# Patient Record
Sex: MALE | Race: BLACK OR AFRICAN AMERICAN
[De-identification: names, ages, dates, MRNs, and addresses within clinical notes are randomized per-mention and may not be internally consistent; named-entity substitution may affect disease eponyms.]

---

## 2019-02-25 ENCOUNTER — HOSPITAL ENCOUNTER (EMERGENCY)
Dept: HOSPITAL 72 - EMR | Age: 54
Discharge: HOME | End: 2019-02-25
Payer: COMMERCIAL

## 2019-02-25 VITALS — DIASTOLIC BLOOD PRESSURE: 69 MMHG | SYSTOLIC BLOOD PRESSURE: 115 MMHG

## 2019-02-25 VITALS — SYSTOLIC BLOOD PRESSURE: 114 MMHG | DIASTOLIC BLOOD PRESSURE: 75 MMHG

## 2019-02-25 VITALS — BODY MASS INDEX: 25.03 KG/M2 | WEIGHT: 195 LBS | HEIGHT: 74 IN

## 2019-02-25 VITALS — SYSTOLIC BLOOD PRESSURE: 113 MMHG | DIASTOLIC BLOOD PRESSURE: 60 MMHG

## 2019-02-25 DIAGNOSIS — K52.9: Primary | ICD-10-CM

## 2019-02-25 LAB
ADD MANUAL DIFF: YES
ALBUMIN SERPL-MCNC: 4.2 G/DL (ref 3.4–5)
ALBUMIN/GLOB SERPL: 1 {RATIO} (ref 1–2.7)
ALP SERPL-CCNC: 79 U/L (ref 46–116)
ALT SERPL-CCNC: 41 U/L (ref 12–78)
ANION GAP SERPL CALC-SCNC: 11 MMOL/L (ref 5–15)
APPEARANCE UR: CLEAR
APTT PPP: YELLOW S
AST SERPL-CCNC: 22 U/L (ref 15–37)
BILIRUB SERPL-MCNC: 1 MG/DL (ref 0.2–1)
BUN SERPL-MCNC: 14 MG/DL (ref 7–18)
CALCIUM SERPL-MCNC: 10.4 MG/DL (ref 8.5–10.1)
CHLORIDE SERPL-SCNC: 104 MMOL/L (ref 98–107)
CO2 SERPL-SCNC: 26 MMOL/L (ref 21–32)
CREAT SERPL-MCNC: 1.4 MG/DL (ref 0.55–1.3)
ERYTHROCYTE [DISTWIDTH] IN BLOOD BY AUTOMATED COUNT: 11.2 % (ref 11.6–14.8)
GLOBULIN SER-MCNC: 4.2 G/DL
GLUCOSE UR STRIP-MCNC: NEGATIVE MG/DL
HCT VFR BLD CALC: 50.1 % (ref 42–52)
HGB BLD-MCNC: 16.9 G/DL (ref 14.2–18)
KETONES UR QL STRIP: (no result)
LEUKOCYTE ESTERASE UR QL STRIP: (no result)
MCV RBC AUTO: 91 FL (ref 80–99)
NITRITE UR QL STRIP: NEGATIVE
PH UR STRIP: 7 [PH] (ref 4.5–8)
PLATELET # BLD: 153 K/UL (ref 150–450)
POTASSIUM SERPL-SCNC: 4.6 MMOL/L (ref 3.5–5.1)
PROT UR QL STRIP: (no result)
RBC # BLD AUTO: 5.49 M/UL (ref 4.7–6.1)
SODIUM SERPL-SCNC: 141 MMOL/L (ref 136–145)
SP GR UR STRIP: 1 (ref 1–1.03)
UROBILINOGEN UR-MCNC: NORMAL MG/DL (ref 0–1)
WBC # BLD AUTO: 8.2 K/UL (ref 4.8–10.8)

## 2019-02-25 PROCEDURE — 83690 ASSAY OF LIPASE: CPT

## 2019-02-25 PROCEDURE — 74177 CT ABD & PELVIS W/CONTRAST: CPT

## 2019-02-25 PROCEDURE — 96374 THER/PROPH/DIAG INJ IV PUSH: CPT

## 2019-02-25 PROCEDURE — 36415 COLL VENOUS BLD VENIPUNCTURE: CPT

## 2019-02-25 PROCEDURE — 80053 COMPREHEN METABOLIC PANEL: CPT

## 2019-02-25 PROCEDURE — 96375 TX/PRO/DX INJ NEW DRUG ADDON: CPT

## 2019-02-25 PROCEDURE — 85007 BL SMEAR W/DIFF WBC COUNT: CPT

## 2019-02-25 PROCEDURE — 85025 COMPLETE CBC W/AUTO DIFF WBC: CPT

## 2019-02-25 PROCEDURE — 96361 HYDRATE IV INFUSION ADD-ON: CPT

## 2019-02-25 PROCEDURE — 99284 EMERGENCY DEPT VISIT MOD MDM: CPT

## 2019-02-25 PROCEDURE — 81003 URINALYSIS AUTO W/O SCOPE: CPT

## 2019-02-25 NOTE — EMERGENCY ROOM REPORT
History of Present Illness


General


Chief Complaint:  Abdominal Pain


Source:  Patient





Present Illness


HPI


This is a pleasant 54-year-old male who complains of lower abdominal pain that 

started this morning.  A dull pain that is constant.  There is no exacerbating 

or relieving factor.  Associated with vomiting, subjective fever, diarrhea.  He 

has not taken any medication for this.


Allergies:  


Coded Allergies:  


     No Known Allergies (Unverified , 2/25/19)





Patient History


Past Medical History:  none


Past Surgical History:  none


Pertinent Family History:  none





Nursing Documentation-[facility]


Past Medical History:  No Stated History





Review of Systems


All Other Systems:  negative except mentioned in HPI





Physical Exam





Vital Signs








  Date Time  Temp Pulse Resp B/P (MAP) Pulse Ox O2 Delivery O2 Flow Rate FiO2


 


2/25/19 16:05 99.0 101 16 114/75 96   








General Appearance:  well appearing, no apparent distress


Head:  normocephalic, atraumatic


ENT:  hearing grossly normal, normal voice


Neck:  full range of motion, supple


Respiratory:  no respiratory distress, speaking full sentences


Gastrointestinal:  soft, no peritonitis, non-distended, no guarding, no hernia, 

no pulsatile mass, tenderness - RLQ


Musculoskeletal:  normal inspection


Neurologic:  alert, normal gait


Psychiatric:  mood/affect normal


Skin:  no rash





Medical Decision Making


Diagnostic Impression:  


 Primary Impression:  


 Gastroenteritis


ER Course


Patient presents complexes or risk.  As particularly very concerned about acute 

appendicitis, testicular torsion, diverticulitis, incarcerated hernia, small 

bowel obstruction.  The patient has had multiple bedside evaluations.  Also 

considered urolithiasis and pyelonephritis.  Blood work was reviewed.  The 

patient was given IV fluids for volume resuscitation.  Patient was also given 

IV morphine for pain.  I reexamined the patient and his pain is completely 

resolved.  He is able to tolerate a by mouth challenge.  Is afebrile.  CT was 

reviewed.  At this time I feel the patient may be discharged home with very 

close follow-up with his primary care physician as an outpatient we will start 

the patient on symptomatically treatment.





Laboratory Tests








Test


  2/25/19


16:45


 


White Blood Count


  8.2 K/UL


(4.8-10.8)


 


Red Blood Count


  5.49 M/UL


(4.70-6.10)


 


Hemoglobin


  16.9 G/DL


(14.2-18.0)


 


Hematocrit


  50.1 %


(42.0-52.0)


 


Mean Corpuscular Volume 91 FL (80-99)  


 


Mean Corpuscular Hemoglobin


  30.8 PG


(27.0-31.0)


 


Mean Corpuscular Hemoglobin


Concent 33.8 G/DL


(32.0-36.0)


 


Red Cell Distribution Width


  11.2 %


(11.6-14.8)  L


 


Platelet Count


  153 K/UL


(150-450)


 


Mean Platelet Volume


  7.0 FL


(6.5-10.1)


 


Neutrophils (%) (Auto)


  % (45.0-75.0)


 


 


Lymphocytes (%) (Auto)


  % (20.0-45.0)


 


 


Monocytes (%) (Auto)  % (1.0-10.0)  


 


Eosinophils (%) (Auto)  % (0.0-3.0)  


 


Basophils (%) (Auto)  % (0.0-2.0)  


 


Differential Total Cells


Counted 100  


 


 


Neutrophils % (Manual) 68 % (45-75)  


 


Lymphocytes % (Manual) 11 % (20-45)  L


 


Monocytes % (Manual) 9 % (1-10)  


 


Eosinophils % (Manual) 0 % (0-3)  


 


Basophils % (Manual) 0 % (0-2)  


 


Band Neutrophils 12 % (0-8)  H


 


Platelet Estimate Adequate  


 


Platelet Morphology Normal  


 


Red Blood Cell Morphology Normal  


 


Urine Color Yellow  


 


Urine Appearance Clear  


 


Urine pH 7 (4.5-8.0)  


 


Urine Specific Gravity


  1.005


(1.005-1.035)


 


Urine Protein


  2+ (NEGATIVE)


H


 


Urine Glucose (UA)


  Negative


(NEGATIVE)


 


Urine Ketones


  1+ (NEGATIVE)


H


 


Urine Blood


  1+ (NEGATIVE)


H


 


Urine Nitrite


  Negative


(NEGATIVE)


 


Urine Bilirubin


  Negative


(NEGATIVE)


 


Urine Urobilinogen


  Normal MG/DL


(0.0-1.0)


 


Urine Leukocyte Esterase


  1+ (NEGATIVE)


H


 


Urine RBC


  2-4 /HPF (0 -


0)  H


 


Urine WBC


  2-4 /HPF (0 -


0)


 


Urine Squamous Epithelial


Cells None /LPF


(NONE/OCC)


 


Urine Amorphous Sediment


  Few /LPF


(NONE)  H


 


Urine Bacteria


  Few /HPF


(NONE)


 


Sodium Level


  141 MMOL/L


(136-145)


 


Potassium Level


  4.6 MMOL/L


(3.5-5.1)


 


Chloride Level


  104 MMOL/L


()


 


Carbon Dioxide Level


  26 MMOL/L


(21-32)


 


Anion Gap


  11 mmol/L


(5-15)


 


Blood Urea Nitrogen


  14 mg/dL


(7-18)


 


Creatinine


  1.4 MG/DL


(0.55-1.30)  H


 


Estimate Glomerular


Filtration Rate > 60 mL/min


(>60)


 


Glucose Level


  111 MG/DL


()  H


 


Calcium Level


  10.4 MG/DL


(8.5-10.1)  H


 


Total Bilirubin


  1.0 MG/DL


(0.2-1.0)


 


Aspartate Amino Transferase


(AST) 22 U/L (15-37)


 


 


Alanine Aminotransferase (ALT)


  41 U/L (12-78)


 


 


Alkaline Phosphatase


  79 U/L


()


 


Total Protein


  8.4 G/DL


(6.4-8.2)  H


 


Albumin


  4.2 G/DL


(3.4-5.0)


 


Globulin 4.2 g/dL  


 


Albumin/Globulin Ratio 1.0 (1.0-2.7)  


 


Lipase


  157 U/L


()











Last Vital Signs








  Date Time  Temp Pulse Resp B/P (MAP) Pulse Ox O2 Delivery O2 Flow Rate FiO2


 


2/25/19 16:27  101 16     


 


2/25/19 16:27 99.0   114/75 96   








Disposition:  HOME, SELF-CARE


Condition:  Stable


Scripts


Loperamide Hcl (IMODIUM A-D) 1 Mg/7.5 Ml Liquid


1 MG PO EVERY 3 HOURS PRN for Diarrhea for 4 Days, #10 ML


   Prov: NEO PEREZ         2/25/19 


Ondansetron (Zofran) 4 Mg Tablet


4 MG ORAL Q6H PRN for Nausea & Vomiting, #10 TAB


   Prov: NEO PEREZ         2/25/19 


Hydrocodone/Acetaminophen 7.5-325* (HYDROCODON-ACETAMINOPH 7.5-325*) 1 Each 

Tablet


1 TAB ORAL Q6H PRN for For Pain, #12 TAB 0 Refills


   Prov: NEO PEREZ         2/25/19


Patient Instructions:  Abdominal Pain, Adult











NEO PEREZ Feb 25, 2019 16:46

## 2019-02-25 NOTE — NUR
ED Nurse Note:

Patient biba from home c/o of nausea and vomiting, patient presents with an 
inguinal hernia, states that he has spoken to his doctor and states that he did 
get diagnosed with it, patient was planning to meet with his doctor but states 
that since he had an episode of extreme toumach pain today he called 911. 
patient is alert and oriented x4, ambulatory with a steady gait, VSS

## 2019-02-26 NOTE — DIAGNOSTIC IMAGING REPORT
Clinical Indication: Right lower quadrant pain

 

Technique:   No oral contrast utilized, per emergency room physician request  IV

administration nonionic contrast. Venous phase spiral acquisition obtained through

the abdomen and pelvis. Multiplanar reconstructions were generated. Total dose length

product 646.29 mGycm. CTDIvol(s) 12.06 mGy. Dose reduction achieved using automated

exposure control

 

 

Comparison: none

 

Findings: There are small colonic diverticula noted. The appendix is normal. A few

ileal loops are prominent and fluid-filled there is equivocal mild wall thickening of

the proximal jejunum. Distal esophagus, stomach, duodenum are unremarkable. No free

or loculated intraperitoneal gas or fluid is evident.

 

The liver demonstrates a subcentimeter low-attenuation lesion in the left hepatic

lobe, segment 3. Gallbladder, bile ducts, pancreas, spleen, adrenals are

unremarkable. Right kidney demonstrates a subcentimeter low-attenuation lesion in the

lower pole which is too small to characterize. No renal or ureteral calculi,

hydronephrosis, or hydroureter demonstrated. No pelvic mass or adenopathy. No

retroperitoneal or mesenteric mass or adenopathy. The  included lung bases are clear.

The bones demonstrate minimal degenerative subchondral cystic changes of the

acetabula bilaterally. 

 

Impression: Mildly prominent fluid-filled ileum, equivocal mild jejunal wall

thickening, could indicate mild enteritis changes.

 

No acute abnormality otherwise

 

Subcentimeter low-attenuation hepatic and right renal lesions, too small to

characterize but most likely representing benign cysts. No further follow-up

necessary

 

This agrees with the preliminary interpretation provided overnight by [facility]

teleradiology service.

 

The CT scanner at [place] [facility] is accredited by the [employer]

[employer]iology and the scans are performed using protocols designed to limit radiation

exposure to as low as reasonably achievable to attain images of sufficient resolution

adequate for diagnostic evaluation.